# Patient Record
Sex: MALE | Race: OTHER | NOT HISPANIC OR LATINO | URBAN - METROPOLITAN AREA
[De-identification: names, ages, dates, MRNs, and addresses within clinical notes are randomized per-mention and may not be internally consistent; named-entity substitution may affect disease eponyms.]

---

## 2022-03-18 ENCOUNTER — EMERGENCY (EMERGENCY)
Facility: HOSPITAL | Age: 42
LOS: 0 days | Discharge: HOME | End: 2022-03-18
Attending: STUDENT IN AN ORGANIZED HEALTH CARE EDUCATION/TRAINING PROGRAM | Admitting: STUDENT IN AN ORGANIZED HEALTH CARE EDUCATION/TRAINING PROGRAM
Payer: COMMERCIAL

## 2022-03-18 VITALS
DIASTOLIC BLOOD PRESSURE: 109 MMHG | TEMPERATURE: 98 F | RESPIRATION RATE: 17 BRPM | OXYGEN SATURATION: 100 % | WEIGHT: 173.06 LBS | SYSTOLIC BLOOD PRESSURE: 186 MMHG | HEART RATE: 66 BPM | HEIGHT: 71 IN

## 2022-03-18 DIAGNOSIS — K08.89 OTHER SPECIFIED DISORDERS OF TEETH AND SUPPORTING STRUCTURES: ICD-10-CM

## 2022-03-18 DIAGNOSIS — K04.7 PERIAPICAL ABSCESS WITHOUT SINUS: ICD-10-CM

## 2022-03-18 PROCEDURE — 99284 EMERGENCY DEPT VISIT MOD MDM: CPT

## 2022-03-18 NOTE — ED PROVIDER NOTE - ATTENDING CONTRIBUTION TO CARE
40 yo M no pmh pw dental pain. Started 1 month ago, located over R upper molar. Worse with chewing and brushing teeth. No n/v/d, no cp, no sob, no back pain, no facial swelling, no ear pain, no neck pain/swelling, no difficulty speaking/swallowing.     CONSTITUTIONAL: Well-developed; well-nourished; in no acute distress. Sitting up and providing appropriate history and physical examination  SKIN: skin exam is warm and dry, no acute rash.  HEAD: Normocephalic; atraumatic.  EYES: PERRL, 3 mm bilateral, no nystagmus, EOM intact; conjunctiva and sclera clear.  ENT: No nasal discharge; airway clear.  NECK: Supple; non tender. + full passive ROM in all directions. No JVD  EXT: Normal ROM. No clubbing, cyanosis or edema. Dp and Pt Pulses intact. Cap refill less than 3 seconds  NEURO: CN 2-12 intact, normal finger to nose, normal romberg, stable gait, no sensory or motor deficits, Alert, oriented, grossly unremarkable. No Focal deficits. GCS 15. NIH 0  PSYCH: Cooperative, appropriate.

## 2022-03-18 NOTE — ED PROVIDER NOTE - OBJECTIVE STATEMENT
41 y m  no pmh pw dental pain. Started 1 month ago, rt upper molar, intermittent, 8-10/10, also having rt incisor pain after brushing, worse w chewing. Denies f/c, cp, sob, n/v/d, abd pain dysuria

## 2022-03-18 NOTE — ED PROVIDER NOTE - CLINICAL SUMMARY MEDICAL DECISION MAKING FREE TEXT BOX
I personally evaluated the patient. I reviewed the Resident’s or Physician Assistant’s note (as assigned above), and agree with the findings and plan except as documented in my note. Patient evaluated for dental pain. Airway intact, well appearing. Sent to dental clinic for further evaluation and treatment.

## 2022-03-18 NOTE — ED PROVIDER NOTE - PHYSICAL EXAMINATION
CONSTITUTIONAL: NAD  SKIN: Warm dry  HEAD: NCAT  EYES: NL inspection  ENT: Multiple tooth missing. Slightly ttp rt molar. No obvs abscess/cavities.   NECK: Supple; non tender.  CARD: RRR  RESP: CTAB  ABD: S/NT no R/G  EXT: no pedal edema  NEURO: Grossly unremarkable  PSYCH: Cooperative, appropriate.

## 2022-03-18 NOTE — CONSULT NOTE ADULT - SUBJECTIVE AND OBJECTIVE BOX
Patient is a 41y old  Male who presents with a chief complaint of pain on maxillary right side of month.     HPI: Patient reports difficulty locating primary dentist under insurance network. Patient reports pain is triggered by drinking cold water and sometimes spontaneous. Patient pointed to pain coming from tooth #3      PAST MEDICAL & SURGICAL HISTORY: hypertension, asthma    ( -  ) heart valve replacement  ( -  ) joint replacement    Allergies    No Known Allergies    Intolerances      *SOCIAL HISTORY: ( +  ) Tobacco    Vital Signs Last 24 Hrs  T(C): 36.7 (18 Mar 2022 11:30), Max: 36.7 (18 Mar 2022 11:30)  T(F): 98 (18 Mar 2022 11:30), Max: 98 (18 Mar 2022 11:30)  HR: 66 (18 Mar 2022 11:30) (66 - 66)  BP: 186/109 (18 Mar 2022 11:30) (186/109 - 186/109)  BP(mean): --  RR: 17 (18 Mar 2022 11:30) (17 - 17)  SpO2: 100% (18 Mar 2022 11:30) (100% - 100%)    Last Dental Visit: << patient reported he visited an outpatient dentist and was advised that he is not an implant candidate.  >>    EOE:  TMJ (  - ) clicks                     ( -  ) pops                     ( -  ) crepitus             Mandible <<FROM>>             Facial bones and MOM <<grossly intact>>             ( - ) trismus             (  - ) lymphadenopathy             ( -  ) swelling             ( -  ) asymmetry             ( -  ) palpation             ( -  ) dyspnea             ( -  ) dysphagia             ( -  ) loss of consciousness    IOE:  <<permanent>> dentition           <<multiple missing teeth>>             Dentition Present <<  >>             Mobility << generalized grade 2-3 mobility.  >>                                hard/soft palate:  ( -  ) palatal torus, <<No pathology noted>>            tongue/FOM <<No pathology noted>>            labial/buccal mucosa <<No pathology noted>>           ( +  ) percussion: # 3 and 4           ( - ) palpation           (  - ) swelling            ( -  ) abscess           (  - ) sinus tract    *DENTAL RADIOGRAPHS: 1 periapical xray taken of maxillary right side, showing severe bone loss around teeth # 3,4,5 and periapical infection around tooth #5    *ASSESSMENT: There is severe bone loss around teeth # 3,4,5 and periapical infection around tooth #5. Tooth # 26 has grade 3 mobility.  Nonrestorable teeth # 3,4,5, and 26. No intraoral or extraoral swelling noted.     *PLAN: Patient was explained findings and that teeth # 3,4,5, and 26 are nonrestorable, patient refused extractions unless extracted teeth will be replaced. Patient was explained that his insurance is not accepted at [facility] so comprehensive dental care cannot be rendered as [facility] is an out of network provider. Patient was advised to call insurance to get a list of outpatient dentists under-network. Patient was prescribed amoxicillin 500mg 8hrs for 7 days and Ibuprofen 600mg q6hrs for pain management.     RECOMMENDATIONS:  1) << amoxicillin 500mg 8hrs for 7 days and Ibuprofen 600mg q6hrs for pain management.   >>  2) Dental F/U with outpatient dentist for comprehensive dental care.   3) If any difficulty swallowing/breathing, fever occur, return to ER.     Salima Nickerson [facility], Pager #[phone]

## 2022-03-18 NOTE — ED ADULT NURSE NOTE - HIV OFFER
May 25, 2017         Patient: Tushar Barrera   YOB: 2011   Date of Visit: 5/25/2017           To Whom it May Concern:    Tushar Barrera was seen in my clinic on 5/25/2017. He may return to school today.  If you have any questions or concerns, please don't hesitate to call.        Sincerely,         Evelia Damon  PEDIATRICS MA  Electronically Signed      Opt out